# Patient Record
Sex: FEMALE | Race: WHITE | NOT HISPANIC OR LATINO | Employment: OTHER | URBAN - METROPOLITAN AREA
[De-identification: names, ages, dates, MRNs, and addresses within clinical notes are randomized per-mention and may not be internally consistent; named-entity substitution may affect disease eponyms.]

---

## 2022-03-29 ENCOUNTER — NEW PATIENT (OUTPATIENT)
Dept: URBAN - METROPOLITAN AREA CLINIC 14 | Facility: CLINIC | Age: 62
End: 2022-03-29

## 2022-03-29 DIAGNOSIS — H40.033: ICD-10-CM

## 2022-03-29 DIAGNOSIS — H35.373: ICD-10-CM

## 2022-03-29 DIAGNOSIS — H52.03: ICD-10-CM

## 2022-03-29 PROCEDURE — 92004 COMPRE OPH EXAM NEW PT 1/>: CPT

## 2022-03-29 PROCEDURE — 92134 CPTRZ OPH DX IMG PST SGM RTA: CPT

## 2022-03-29 PROCEDURE — 92310C CONTACT LENS 75

## 2022-03-29 PROCEDURE — 92015 DETERMINE REFRACTIVE STATE: CPT

## 2022-03-29 ASSESSMENT — VISUAL ACUITY
OS_GLARE: 20/40
OS_CC: 20/40
OD_CC: 20/30-1
OD_GLARE: 20/50
OU_CC: 20/25

## 2022-03-29 ASSESSMENT — TONOMETRY
OS_IOP_MMHG: 16
OD_IOP_MMHG: 25

## 2022-04-21 ENCOUNTER — ESTABLISHED PATIENT (OUTPATIENT)
Dept: URBAN - METROPOLITAN AREA CLINIC 14 | Facility: CLINIC | Age: 62
End: 2022-04-21

## 2022-04-21 DIAGNOSIS — H52.03: ICD-10-CM

## 2022-04-21 PROCEDURE — 92012 INTRM OPH EXAM EST PATIENT: CPT

## 2022-09-01 ENCOUNTER — ESTABLISHED PATIENT (OUTPATIENT)
Dept: URBAN - METROPOLITAN AREA CLINIC 14 | Facility: CLINIC | Age: 62
End: 2022-09-01

## 2022-09-01 DIAGNOSIS — H57.11: ICD-10-CM

## 2022-09-01 DIAGNOSIS — T15.11XA: ICD-10-CM

## 2022-09-01 PROCEDURE — 99213 OFFICE O/P EST LOW 20 MIN: CPT

## 2022-09-01 PROCEDURE — 65205 REMOVE FOREIGN BODY FROM EYE: CPT

## 2022-09-01 ASSESSMENT — VISUAL ACUITY
OS_SC: 20/70
OD_SC: 20/70

## 2022-09-01 NOTE — PATIENT DISCUSSION
FB was removed at the slit lamp. 1 gtts of Besivance placed in eye for prophylaxis. OK to resume CLRx wear prn.

## 2022-09-01 NOTE — PROCEDURE NOTE: CLINICAL
PROCEDURE NOTE: Removal of Conjunctival FB, Superficial OD. Diagnosis: Foreign Body in Conjunctival Sac. Anesthesia: Topical. Prep: Antibiotic Drops q 5min x 3. Prior to treatment, the risks/benefits/alternatives were discussed. The patient wished to proceed with procedure. Superficial conjunctival FB removed with forceps. Globe and conjunctiva intact. Patient tolerated procedure well. There were no complications. Post procedure instructions given. Marylou Pulliam

## 2023-08-02 ENCOUNTER — COMPREHENSIVE EXAM (OUTPATIENT)
Dept: URBAN - METROPOLITAN AREA CLINIC 16 | Facility: CLINIC | Age: 63
End: 2023-08-02

## 2023-08-02 DIAGNOSIS — H43.813: ICD-10-CM

## 2023-08-02 DIAGNOSIS — H40.033: ICD-10-CM

## 2023-08-02 DIAGNOSIS — H43.822: ICD-10-CM

## 2023-08-02 DIAGNOSIS — H52.03: ICD-10-CM

## 2023-08-02 PROCEDURE — 92134 CPTRZ OPH DX IMG PST SGM RTA: CPT

## 2023-08-02 PROCEDURE — 92015 DETERMINE REFRACTIVE STATE: CPT

## 2023-08-02 PROCEDURE — 92014 COMPRE OPH EXAM EST PT 1/>: CPT

## 2023-08-02 ASSESSMENT — VISUAL ACUITY
OD_CC: 20/20-1
OU_CC: J3
OS_CC: 20/40-2
OS_CC: J3
OU_CC: 20/20-1

## 2023-08-02 ASSESSMENT — TONOMETRY
OS_IOP_MMHG: 17
OD_IOP_MMHG: 20

## 2023-08-16 ENCOUNTER — CONTACT LENSES/GLASSES VISIT (OUTPATIENT)
Dept: URBAN - METROPOLITAN AREA CLINIC 16 | Facility: CLINIC | Age: 63
End: 2023-08-16

## 2023-08-16 ASSESSMENT — VISUAL ACUITY
OS_CC: 20/200
OS_CC: J1
OD_CC: 20/25+3
OU_CC: 20/25-1

## 2024-02-22 NOTE — PATIENT DISCUSSION
The patient has narrow angles that are not occludable at this time. Periodic examinations have been recommended to the patient. Angle closure symptoms and signs have been reviewed. Normal for race